# Patient Record
(demographics unavailable — no encounter records)

---

## 2024-12-12 NOTE — PHYSICAL EXAM
[Normal Breath Sounds] : Normal breath sounds [Normal Heart Sounds] : normal heart sounds [No Rash or Lesion] : No rash or lesion [Alert] : alert [Oriented to Person] : oriented to person [Oriented to Place] : oriented to place [Oriented to Time] : oriented to time [Calm] : calm [Fistula] : no fistulas [Wart] : no warts [Ulcer ___ cm] : no ulcers [Normal] : was normal [None] : there was no rectal mass  [de-identified] : Abdomen soft and NT  [de-identified] : no sig external hemorrhoids [de-identified] : tender levators R>L, anoscopy reveals no proctitis [de-identified] : NAD [de-identified] : NCAT [de-identified] : + ROM

## 2024-12-12 NOTE — ASSESSMENT
[FreeTextEntry1] : Will do a trial of PO valium - he will replace his nightly dose of ativan with valium f/u in 2-3 weeks if not improved. We discussed botox as an option as an off label use for levator spasm

## 2024-12-12 NOTE — CONSULT LETTER
[Dear  ___] : Dear  [unfilled], [Courtesy Letter:] : I had the pleasure of seeing your patient, [unfilled], in my office today. [Please see my note below.] : Please see my note below. [Sincerely,] : Sincerely, [FreeTextEntry3] : Raymon Sevilla MD, FACS, FASCRS Colorectal Surgery [facility] Assistant Professor of Surgery [employer] at [address], [address] [place] [address] Tel: [phone]  Cell: [phone]  Email: [email or web address]

## 2024-12-12 NOTE — HISTORY OF PRESENT ILLNESS
[FreeTextEntry1] : James Trinidad is a 68 y/o male who presents for evaluation of rectal pain. The pain started Feb of this year after using a vibrator on his penis after which he had penile pain. After several days the pain began radiating to his rectum. He has trialed baclofen/valium suppositories and physical therapy with some mild improvement but found the suppositories difficult to insert due to pain. Patient reports a history of constipation for which he has trialed flax seed and diet modification with improvement. He reports having to strain at times with bowel movements but tries to avoid.  Patient denies weight loss, abdominal pain, nausea/vomiting, and bleeding. Last colonoscopy was years ago, and he cannot recall the report but did a Cologuard test in 2021 which was negative.